# Patient Record
Sex: FEMALE | Employment: OTHER | URBAN - METROPOLITAN AREA
[De-identification: names, ages, dates, MRNs, and addresses within clinical notes are randomized per-mention and may not be internally consistent; named-entity substitution may affect disease eponyms.]

---

## 2017-08-23 NOTE — PATIENT DISCUSSION
(K90.0588) Primary open-angle glaucoma bilateral mild stage - Assesment : Examination revealed Primary Open Angle Glaucoma. Hx of SLT OU. IOP stable today OU. - Plan : Continue Latanoprost OU QHS. Monitor for changes. RV 6 months tn check/[facility] OCT.

## 2017-08-23 NOTE — PATIENT DISCUSSION
(G14.426) Other secondary cataract, left eye - Assesment : Posterior capsule opacification present. Patient is currently asymptomatic and functioning well. - Plan : Monitor for Changes. Advised patient to call our office with decreased vision or increased symptoms.

## 2018-02-19 NOTE — PATIENT DISCUSSION
(E52.1857) Primary open-angle glaucoma bilateral mild stage - Assesment : Examination revealed Primary Open Angle Glaucoma. Hx of SLT OU. IOP stable today OU. - Plan : Continue Latanoprost OU QHS. Monitor for changes. RV 6 months Exam/[facility] OCT.

## 2018-02-19 NOTE — PATIENT DISCUSSION
(E16.023) Other secondary cataract, left eye - Assesment : Posterior capsule opacification present. Patient is currently asymptomatic and functioning well. - Plan : Monitor for Changes. Advised patient to call our office with decreased vision or increased symptoms.

## 2018-06-11 PROBLEM — F41.0 SEVERE ANXIETY WITH PANIC: Status: ACTIVE | Noted: 2018-06-11

## 2018-06-11 PROBLEM — I50.9 CHF (CONGESTIVE HEART FAILURE) (HCC): Status: ACTIVE | Noted: 2018-06-11

## 2018-08-20 NOTE — PATIENT DISCUSSION
(N31.651) Other secondary cataract, left eye - Assesment : Posterior capsule opacification present. Patient is currently asymptomatic and functioning well. - Plan : Monitor for Changes. Advised patient to call our office with decreased vision or increased symptoms.

## 2018-08-20 NOTE — PATIENT DISCUSSION
(U02.086) Keratoconjunct sicca, not specified as Sjogren's, bilateral - Assesment : Examination revealed Dry Eye Syndrome OU. - Plan : Monitor for changes. Advised patient to call our office with decreased vision or increased symptoms.

## 2018-08-20 NOTE — PATIENT DISCUSSION
(R17.6899) Primary open-angle glaucoma bilateral mild stage - Assesment : Examination revealed Primary Open Angle Glaucoma. Hx of SLT OU. IOP stable today OU. Superotemporal thinning OU - stable on ONH OCT. - Plan : Continue Latanoprost OU QHS. Monitor for changes. RV 6-8 months tn check/[facility] OCT.

## 2018-08-20 NOTE — PATIENT DISCUSSION
(H35.362) Drusen (degenerative) of macula, left eye - Assesment : Examination revealed Drusen. - Plan : Monitor for changes. Advised patient to call our office with decreased vision or increased symptoms.

## 2019-05-13 NOTE — PATIENT DISCUSSION
(J44.922) Keratoconjunct sicca, not specified as Sjogren's, bilateral - Assesment : Examination revealed Dry Eye Syndrome OU. - Plan : Recommend artificial tears 2-3 times daily OU. Monitor for changes. Advised patient to call our office with decreased vision or increased symptoms.

## 2019-08-12 ENCOUNTER — TELEPHONE (OUTPATIENT)
Dept: NEUROLOGY | Age: 69
End: 2019-08-12

## 2019-08-12 PROBLEM — R51.9 HEADACHE: Status: ACTIVE | Noted: 2019-08-12

## 2019-08-12 PROBLEM — E87.6 HYPOKALEMIA: Status: ACTIVE | Noted: 2019-08-12

## 2019-08-12 PROBLEM — I10 ELEVATED BLOOD PRESSURE READING WITH DIAGNOSIS OF HYPERTENSION: Status: ACTIVE | Noted: 2019-08-12

## 2019-08-12 PROBLEM — R19.7 DIARRHEA: Status: ACTIVE | Noted: 2019-08-12

## 2019-08-12 PROBLEM — R11.2 INTRACTABLE NAUSEA AND VOMITING: Status: ACTIVE | Noted: 2019-08-12

## 2019-08-12 NOTE — TELEPHONE ENCOUNTER
81year-old female with six months of headache now with vomiting and diarrhea for the past week. Dr. Anand Porras contacted the neurovascular service regarding this patient in the [address] [place] [address] ED. See his note for details. Briefly, the patient had experienced six months of headache. Last week she started having vomiting. She was evaluated at [facility].  After a few days she was feeling better and was discharged on Thursday she was feeling better. The vomiting returned. She came to the [address] [place] [address] ED. CT reported no acute process. CTA reported concerning for multifocal cerebral arterial stenoses. Dr. Jackie Rodriguez was contacted and asked the neurovascular service be contacted. Neuroimaging personally reviewed. CT no ICH, no territorial infarct, hyperdense internal cerebral veins and vein of Dagoberto. . CTA RP3 LP3 RM2 RM3 RA3 multifocal stenosis, no aneurysm, no high flow plexiform AVM nidus observed, internal cerebral veins and deep venous system patent but distal sigmoid sinuses and internal jugular veins not visualized on an arterial phase study. From a neurovascular standpoint the patient has multifocal distal cerebral arterial stenoses and the distal sigmoid sinuses and internal jugular veins are not visualized on an arterial phase study so cerebral venous thrombosis is not excluded. Neither of these two conditions require emergent neurointervention. Consider consulting teleneurology for vascular neurology evaluation that could included cerebral arteritis, cerebral vasoconstrictor syndrome, and cerebral venous thrombosis. If an elective cerebral angiogram is desired as part of the workup please recontact the neurovascular service on a non-emergent basis. 37 minutes of neurologic care provided.     Oriana Lomax MD

## 2019-09-25 NOTE — PATIENT DISCUSSION
s/p PCIOL-Stable PCIOL s/p YAG Caps OU.-Monitor. ASVD +3-Discussed in detail w/pt -Recommend pt monitor BP and cholesterol KATHERIN-Explained KATHERIN and associated symptoms.-Pt to begin refresh tears OU QID PRN. Pt will contact us if this does not provide relief. Consider punctal plugs in that case. RTC 1 yr CEE

## 2020-06-04 ENCOUNTER — PREPPED CHART (OUTPATIENT)
Dept: RURAL CLINIC 2 | Facility: CLINIC | Age: 70
End: 2020-06-04

## 2020-10-30 PROBLEM — M77.30 CALCANEAL SPUR: Status: ACTIVE | Noted: 2020-07-01

## 2020-10-30 PROBLEM — I50.43 ACUTE ON CHRONIC COMBINED SYSTOLIC AND DIASTOLIC HEART FAILURE (HCC): Status: ACTIVE | Noted: 2018-06-17

## 2020-10-30 PROBLEM — N17.9 ACUTE RENAL FAILURE SYNDROME (HCC): Status: ACTIVE | Noted: 2020-10-30

## 2020-10-30 PROBLEM — F41.1 ANXIETY STATE: Status: ACTIVE | Noted: 2017-10-02

## 2020-10-30 PROBLEM — D64.9 ANEMIA: Status: ACTIVE | Noted: 2020-10-30

## 2020-10-30 PROBLEM — S62.619A FRACTURE OF PROXIMAL PHALANX OF FINGER: Status: ACTIVE | Noted: 2019-09-11

## 2020-10-30 PROBLEM — I70.90 OCCLUSION OF ARTERY: Status: ACTIVE | Noted: 2019-08-12

## 2020-10-30 PROBLEM — F41.1 GENERALIZED ANXIETY DISORDER: Status: ACTIVE | Noted: 2018-08-29

## 2020-10-30 PROBLEM — G25.5 CHOREA: Status: ACTIVE | Noted: 2020-10-30

## 2020-10-30 PROBLEM — G08: Status: ACTIVE | Noted: 2019-08-23

## 2020-10-30 PROBLEM — I63.9 CEREBROVASCULAR ACCIDENT (HCC): Status: ACTIVE | Noted: 2019-09-05

## 2020-10-30 PROBLEM — I10 ESSENTIAL HYPERTENSION, BENIGN: Status: ACTIVE | Noted: 2018-08-29

## 2020-10-30 PROBLEM — R42 DIZZINESS: Status: ACTIVE | Noted: 2020-05-20
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
## 2020-10-30 PROBLEM — R41.82 ALTERED MENTAL STATUS: Status: ACTIVE | Noted: 2020-05-20

## 2020-10-30 PROBLEM — G43.909 MIGRAINE: Status: ACTIVE | Noted: 2019-09-11

## 2020-10-30 PROBLEM — E78.00 PURE HYPERCHOLESTEROLEMIA: Status: ACTIVE | Noted: 2018-08-29

## 2020-10-30 PROBLEM — R94.31 PROLONGED QT INTERVAL: Status: ACTIVE | Noted: 2020-10-30

## 2020-10-30 PROBLEM — F31.9 BIPOLAR DISORDER (HCC): Status: ACTIVE | Noted: 2019-09-18

## 2020-12-03 NOTE — PATIENT DISCUSSION
s/p PCIOL-Stable PCIOL s/p YAG Caps OU.-Monitor. ASVD +2-Discussed in detail w/pt -Recommend pt monitor BP and cholesterol KATHERIN-Explained KATHERIN and associated symptoms.-Pt to begin refresh tears OU QID PRN. Pt will contact us if this does not provide relief. plug out OD discussed RBA's of plug insertion pt consents. Pt tolerated procedure well. Recommend lid scrubs and warm compresses OU. Lot [id number] 08/31/2022Myopia/astigmatism/presbyopia-Discussed diagnosis with patient. -Explained that people who are myopic are at a higher risk for developing RD/RT and reviewed associated S&S.-Pt to contact our office if symptoms develop. Updated spec Rx given. Recommend lens that will provide comfort as well as protect safety and health of eyes. RTC 1 yr CEE
no

## 2021-03-30 NOTE — PATIENT DISCUSSION
(C92.3628) Primary open-angle glaucoma bilateral mild stage - Assesment : Examination revealed Primary Open Angle Glaucoma. Hx of SLT OU. IOP stable today OU. ONH OCT OD superotemporal thinning - no changes. OS superonasal thinning - no changes. - Plan : Continue Latanoprost OU QHS. Monitor for changes.  RV 6 months VF 24-2/Exam. [History reviewed] : History reviewed. [Medications and Allergies reviewed] : Medications and allergies reviewed.

## 2021-08-03 PROBLEM — I10 ESSENTIAL HYPERTENSION, BENIGN: Status: RESOLVED | Noted: 2018-08-29 | Resolved: 2021-08-03
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
## 2022-03-18 PROBLEM — N17.9 ACUTE RENAL FAILURE SYNDROME (HCC): Status: ACTIVE | Noted: 2020-10-30

## 2022-03-18 PROBLEM — R41.82 ALTERED MENTAL STATUS: Status: ACTIVE | Noted: 2020-05-20

## 2022-03-18 PROBLEM — G43.909 MIGRAINE: Status: ACTIVE | Noted: 2019-09-11

## 2022-03-18 PROBLEM — R94.31 PROLONGED QT INTERVAL: Status: ACTIVE | Noted: 2020-10-30

## 2022-03-19 PROBLEM — I50.9 CHF (CONGESTIVE HEART FAILURE) (HCC): Status: ACTIVE | Noted: 2018-06-11

## 2022-03-19 PROBLEM — G08: Status: ACTIVE | Noted: 2019-08-23

## 2022-03-19 PROBLEM — F31.9 BIPOLAR DISORDER (HCC): Status: ACTIVE | Noted: 2019-09-18

## 2022-03-19 PROBLEM — E78.00 PURE HYPERCHOLESTEROLEMIA: Status: ACTIVE | Noted: 2018-08-29

## 2022-03-19 PROBLEM — F41.1 ANXIETY STATE: Status: ACTIVE | Noted: 2017-10-02

## 2022-03-19 PROBLEM — S62.619A FRACTURE OF PROXIMAL PHALANX OF FINGER: Status: ACTIVE | Noted: 2019-09-11

## 2022-03-19 PROBLEM — R51.9 HEADACHE: Status: ACTIVE | Noted: 2019-08-12

## 2022-03-19 PROBLEM — I63.9 CEREBROVASCULAR ACCIDENT (HCC): Status: ACTIVE | Noted: 2019-09-05

## 2022-03-19 PROBLEM — I10 ELEVATED BLOOD PRESSURE READING WITH DIAGNOSIS OF HYPERTENSION: Status: ACTIVE | Noted: 2019-08-12

## 2022-03-19 PROBLEM — F41.1 GENERALIZED ANXIETY DISORDER: Status: ACTIVE | Noted: 2018-08-29

## 2022-03-19 PROBLEM — R11.2 INTRACTABLE NAUSEA AND VOMITING: Status: ACTIVE | Noted: 2019-08-12

## 2022-03-19 PROBLEM — R42 DIZZINESS: Status: ACTIVE | Noted: 2020-05-20

## 2022-03-19 PROBLEM — I50.43 ACUTE ON CHRONIC COMBINED SYSTOLIC AND DIASTOLIC HEART FAILURE (HCC): Status: ACTIVE | Noted: 2018-06-17

## 2022-03-19 PROBLEM — M77.30 CALCANEAL SPUR: Status: ACTIVE | Noted: 2020-07-01

## 2022-03-19 PROBLEM — D64.9 ANEMIA: Status: ACTIVE | Noted: 2020-10-30

## 2022-03-19 PROBLEM — F41.0 SEVERE ANXIETY WITH PANIC: Status: ACTIVE | Noted: 2018-06-11

## 2022-03-19 PROBLEM — G25.5 CHOREA: Status: ACTIVE | Noted: 2020-10-30

## 2022-03-19 PROBLEM — I70.90 OCCLUSION OF ARTERY: Status: ACTIVE | Noted: 2019-08-12

## 2022-03-19 PROBLEM — R19.7 DIARRHEA: Status: ACTIVE | Noted: 2019-08-12

## 2022-03-20 PROBLEM — E87.6 HYPOKALEMIA: Status: ACTIVE | Noted: 2019-08-12

## 2022-03-30 ENCOUNTER — ESTABLISHED PATIENT (OUTPATIENT)
Dept: RURAL CLINIC 2 | Facility: CLINIC | Age: 72
End: 2022-03-30

## 2022-03-30 DIAGNOSIS — H16.143: ICD-10-CM

## 2022-03-30 DIAGNOSIS — Z96.1: ICD-10-CM

## 2022-03-30 DIAGNOSIS — H16.223: ICD-10-CM

## 2022-03-30 DIAGNOSIS — H52.13: ICD-10-CM

## 2022-03-30 DIAGNOSIS — H52.4: ICD-10-CM

## 2022-03-30 PROCEDURE — 92015 DETERMINE REFRACTIVE STATE: CPT

## 2022-03-30 PROCEDURE — 99214 OFFICE O/P EST MOD 30 MIN: CPT

## 2022-03-30 ASSESSMENT — TONOMETRY
OS_IOP_MMHG: 16
OD_IOP_MMHG: 16

## 2022-03-30 ASSESSMENT — VISUAL ACUITY
OU_CC: J3
OS_SC: 20/40
OD_SC: 20/20-1
OD_CC: 20/25
OS_CC: 20/30

## 2023-02-01 RX ORDER — LISINOPRIL 40 MG/1
40 TABLET ORAL 2 TIMES DAILY
COMMUNITY

## 2023-02-01 RX ORDER — OXYBUTYNIN CHLORIDE 5 MG/1
TABLET ORAL
COMMUNITY
Start: 2020-06-15

## 2023-02-01 RX ORDER — TIZANIDINE 4 MG/1
4 TABLET ORAL 3 TIMES DAILY PRN
COMMUNITY

## 2023-02-01 RX ORDER — ASPIRIN 81 MG/1
81 TABLET, CHEWABLE ORAL DAILY
COMMUNITY

## 2023-02-01 RX ORDER — ATENOLOL 50 MG/1
TABLET ORAL
COMMUNITY

## 2023-02-01 RX ORDER — CLONAZEPAM 0.5 MG/1
TABLET ORAL
COMMUNITY
Start: 2020-12-11

## 2023-02-01 RX ORDER — CLONIDINE HYDROCHLORIDE 0.2 MG/1
TABLET ORAL 2 TIMES DAILY
COMMUNITY

## 2023-02-01 RX ORDER — HYDRALAZINE HYDROCHLORIDE 50 MG/1
TABLET, FILM COATED ORAL
COMMUNITY
Start: 2020-05-22

## 2023-02-01 RX ORDER — FLUTICASONE PROPIONATE 50 MCG
SPRAY, SUSPENSION (ML) NASAL
COMMUNITY
Start: 2020-08-24

## 2023-02-01 RX ORDER — FREMANEZUMAB-VFRM 225 MG/1.5ML
1.5 INJECTION SUBCUTANEOUS
COMMUNITY

## 2023-02-01 RX ORDER — CLOPIDOGREL BISULFATE 75 MG/1
TABLET ORAL
COMMUNITY

## 2023-02-01 RX ORDER — FUROSEMIDE 20 MG/1
TABLET ORAL DAILY
COMMUNITY

## 2023-02-01 RX ORDER — NAPROXEN 500 MG/1
TABLET ORAL
COMMUNITY
Start: 2020-07-20

## 2023-02-01 RX ORDER — ESCITALOPRAM OXALATE 10 MG/1
10 TABLET ORAL DAILY
COMMUNITY

## 2023-02-01 RX ORDER — ATORVASTATIN CALCIUM 20 MG/1
TABLET, FILM COATED ORAL
COMMUNITY
Start: 2020-11-16